# Patient Record
Sex: MALE | Race: WHITE | NOT HISPANIC OR LATINO | URBAN - METROPOLITAN AREA
[De-identification: names, ages, dates, MRNs, and addresses within clinical notes are randomized per-mention and may not be internally consistent; named-entity substitution may affect disease eponyms.]

---

## 2023-09-09 ENCOUNTER — HOSPITAL ENCOUNTER (EMERGENCY)
Facility: HOSPITAL | Age: 23
Discharge: HOME | End: 2023-09-09
Attending: EMERGENCY MEDICINE

## 2023-09-09 VITALS
OXYGEN SATURATION: 99 % | WEIGHT: 160 LBS | BODY MASS INDEX: 21.67 KG/M2 | SYSTOLIC BLOOD PRESSURE: 114 MMHG | HEIGHT: 72 IN | RESPIRATION RATE: 17 BRPM | HEART RATE: 65 BPM | DIASTOLIC BLOOD PRESSURE: 82 MMHG | TEMPERATURE: 98 F
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
## 2023-09-09 DIAGNOSIS — S93.491A SPRAIN OF ANTERIOR TALOFIBULAR LIGAMENT OF RIGHT ANKLE, INITIAL ENCOUNTER: Primary | ICD-10-CM

## 2023-09-09 PROCEDURE — 25000003 PHARM REV CODE 250: Performed by: PHYSICIAN ASSISTANT

## 2023-09-09 PROCEDURE — 99283 EMERGENCY DEPT VISIT LOW MDM: CPT

## 2023-09-09 RX ORDER — NAPROXEN 500 MG/1
500 TABLET ORAL 2 TIMES DAILY PRN
Qty: 20 TABLET | Refills: 0 | Status: SHIPPED | OUTPATIENT
Start: 2023-09-09

## 2023-09-09 RX ORDER — NAPROXEN 500 MG/1
500 TABLET ORAL
Status: COMPLETED | OUTPATIENT
Start: 2023-09-09 | End: 2023-09-09

## 2023-09-09 RX ORDER — ACETAMINOPHEN 500 MG
1000 TABLET ORAL
Status: COMPLETED | OUTPATIENT
Start: 2023-09-09 | End: 2023-09-09

## 2023-09-09 RX ADMIN — NAPROXEN 500 MG: 500 TABLET ORAL at 01:09

## 2023-09-09 RX ADMIN — ACETAMINOPHEN 1000 MG: 500 TABLET ORAL at 01:09

## 2023-09-09 NOTE — ED NOTES
LOC: The patient is awake and alert; oriented x 3 and speaking appropriately.  APPEARANCE: Patient resting comfortably, patient is clean and well groomed  SKIN: warm and dry, normal skin turgor & moist mucus membranes, skin intact, no breakdown noted.  MUSCULOSKELETAL: Patient moving all extremities well, no obvious swelling or deformities noted. Rt ankle swollen and painful, unable to bear weight  RESPIRATORY: Airway is open and patent,  respirations are spontaneous, normal effort and rate  CARDIAC: Patient has a normal rate, no peripheral edema noted, capillary refill < 3 seconds; No complaints of chest pain   ABDOMEN: Soft and non tender to palpation, no distention noted.

## 2023-09-09 NOTE — ED PROVIDER NOTES
Encounter Date: 9/9/2023       History     Chief Complaint   Patient presents with    Ankle Injury     Right ankle injury while playing basketball last night, unable to bear weight; arrives ace wrapped     22-year-old male with no significant past medical history presents for right ankle pain after an injury while playing basketball last night.  States that he fell, inverting the ankle and has not been able to bear weight since.  He reports associated ankle swelling.  He denies any palliating factors, did not take any medication prior to arrival.  No other injury, no pain in the contralateral ankle or knees.      Review of patient's allergies indicates:   Allergen Reactions    Penicillins      History reviewed. No pertinent past medical history.  History reviewed. No pertinent surgical history.  History reviewed. No pertinent family history.  Social History     Tobacco Use    Smoking status: Never    Smokeless tobacco: Never   Substance Use Topics    Alcohol use: Never    Drug use: Never     Review of Systems    Physical Exam     Initial Vitals [09/09/23 1251]   BP Pulse Resp Temp SpO2   110/80 63 12 98.6 °F (37 °C) 98 %      MAP       --         Physical Exam    Nursing note and vitals reviewed.  Constitutional: He appears well-developed and well-nourished.   Cardiovascular:  Normal rate, regular rhythm, normal heart sounds and intact distal pulses.           Pulmonary/Chest: Breath sounds normal.   Musculoskeletal:      Right knee: Normal.      Left knee: Normal.      Right lower leg: Normal.      Left lower leg: Normal.      Right ankle: Swelling present. Tenderness present over the lateral malleolus and ATF ligament.      Right Achilles Tendon: Normal. No tenderness or defects. Dixon's test negative.      Left ankle: Normal.      Left Achilles Tendon: Normal.      Right foot: Normal.      Left foot: Normal.      Comments: Swelling of the right ankle with point tenderness over the lateral malleolus and ATFL.            ED Course   Procedures  Labs Reviewed - No data to display       Imaging Results              X-Ray Ankle Complete Right (Final result)  Result time 09/09/23 13:49:03      Final result by Nico Toledo MD (09/09/23 13:49:03)                   Impression:      As above.      Electronically signed by: Nico Toledo MD  Date:    09/09/2023  Time:    13:49               Narrative:    EXAMINATION:  XR ANKLE COMPLETE 3 VIEW RIGHT    CLINICAL HISTORY:  Unspecified fall, initial encounter    TECHNIQUE:  AP, lateral, and oblique images of the right ankle were performed.    FINDINGS:  The ankle joint space is satisfactorily preserved.  There is no fracture, dislocation, or bony erosion.                                       Medications   naproxen tablet 500 mg (500 mg Oral Given 9/9/23 1329)   acetaminophen tablet 1,000 mg (1,000 mg Oral Given 9/9/23 1329)     Medical Decision Making  22-year-old male presenting for an ankle injury.  His vitals are normal and he is neurovascularly intact.    Differential diagnosis:  Ankle sprain   Ankle fracture   No knee pain or tenderness to suggest Maisonneuve fracture   No clinical signs of Achilles tendon injury    Will give analgesics, do x-rays, place ice pack and reassess.    X-rays negative for fracture.  Will place an Ace wrap, give crutches, discharge with NSAIDs and instructions to follow up with PCP return to the ED for worsening symptoms. Stressed the importance of follow-up, strict ED return precautions given.  Patient voiced understanding and is comfortable with discharge.     Amount and/or Complexity of Data Reviewed  Radiology: ordered and independent interpretation performed. Decision-making details documented in ED Course.    Risk  OTC drugs.  Prescription drug management.               ED Course as of 09/09/23 1512   Sat Sep 09, 2023   1400 X-Ray Ankle Complete Right  Per my independent interpretation, no acute fracture [CC]      ED Course User Index  [CC]  Liset Pathak PA-C                    Clinical Impression:   Final diagnoses:  [W19.XXXA] Fall  [S93.491A] Sprain of anterior talofibular ligament of right ankle, initial encounter (Primary)        ED Disposition Condition    Discharge Stable          ED Prescriptions       Medication Sig Dispense Start Date End Date Auth. Provider    naproxen (NAPROSYN) 500 MG tablet Take 1 tablet (500 mg total) by mouth 2 (two) times daily as needed (pain). 20 tablet 9/9/2023 -- Liset Pathak PA-C          Follow-up Information       Follow up With Specialties Details Why Contact [facility] Family [facility] Schedule an appointment as soon as possible for a visit in 1 week  [address] [facility] [address]  [phone]      [place] - Emergency Dept Emergency Medicine Go to  If symptoms worsen [address] [facility] [id number] [phone]             Liset Pathak PA-C  09/09/23 1512

## 2023-09-09 NOTE — DISCHARGE INSTRUCTIONS
Diagnosis:  Ankle sprain    Your x-ray did not show any broken bones.  You sprained your ankle.  You should rest, ice, compress and elevate your ankle to help with pain and swelling.  I am prescribing an anti-inflammatory medicine for pain that you can take as needed. You should take Tylenol as needed for pain up to 3 grams daily which is 6 of the 500 mg extra strength tablets.  You should try to stay off of it for a few days but it is okay to bear weight as tolerated.    Please schedule an appointment with your primary care doctor for follow-up. If you start to have any new or worsening symptoms, please come back to the emergency department.